# Patient Record
Sex: FEMALE | Race: ASIAN | NOT HISPANIC OR LATINO
[De-identification: names, ages, dates, MRNs, and addresses within clinical notes are randomized per-mention and may not be internally consistent; named-entity substitution may affect disease eponyms.]

---

## 2021-04-30 ENCOUNTER — APPOINTMENT (OUTPATIENT)
Dept: ANTEPARTUM | Facility: CLINIC | Age: 31
End: 2021-04-30
Payer: COMMERCIAL

## 2021-04-30 ENCOUNTER — ASOB RESULT (OUTPATIENT)
Age: 31
End: 2021-04-30

## 2021-04-30 PROCEDURE — 36416 COLLJ CAPILLARY BLOOD SPEC: CPT

## 2021-04-30 PROCEDURE — 99072 ADDL SUPL MATRL&STAF TM PHE: CPT

## 2021-04-30 PROCEDURE — 76801 OB US < 14 WKS SINGLE FETUS: CPT

## 2021-04-30 PROCEDURE — 76813 OB US NUCHAL MEAS 1 GEST: CPT

## 2021-05-19 ENCOUNTER — ASOB RESULT (OUTPATIENT)
Age: 31
End: 2021-05-19

## 2021-05-19 ENCOUNTER — APPOINTMENT (OUTPATIENT)
Dept: MATERNAL FETAL MEDICINE | Facility: CLINIC | Age: 31
End: 2021-05-19

## 2021-05-27 ENCOUNTER — NON-APPOINTMENT (OUTPATIENT)
Age: 31
End: 2021-05-27

## 2021-05-27 ENCOUNTER — ASOB RESULT (OUTPATIENT)
Age: 31
End: 2021-05-27

## 2021-05-27 ENCOUNTER — APPOINTMENT (OUTPATIENT)
Dept: ANTEPARTUM | Facility: CLINIC | Age: 31
End: 2021-05-27
Payer: COMMERCIAL

## 2021-05-27 ENCOUNTER — OUTPATIENT (OUTPATIENT)
Dept: OUTPATIENT SERVICES | Facility: HOSPITAL | Age: 31
LOS: 1 days | End: 2021-05-27

## 2021-05-27 ENCOUNTER — APPOINTMENT (OUTPATIENT)
Dept: ANTEPARTUM | Facility: HOSPITAL | Age: 31
End: 2021-05-27
Payer: COMMERCIAL

## 2021-05-27 DIAGNOSIS — O28.3 ABNORMAL ULTRASONIC FINDING ON ANTENATAL SCREENING OF MOTHER: ICD-10-CM

## 2021-05-27 PROCEDURE — 76946 ECHO GUIDE FOR AMNIOCENTESIS: CPT | Mod: 26

## 2021-05-27 PROCEDURE — 76815 OB US LIMITED FETUS(S): CPT

## 2021-05-27 PROCEDURE — 99072 ADDL SUPL MATRL&STAF TM PHE: CPT

## 2021-05-27 PROCEDURE — 59000 AMNIOCENTESIS DIAGNOSTIC: CPT

## 2021-05-27 PROCEDURE — 99213 OFFICE O/P EST LOW 20 MIN: CPT | Mod: 25

## 2021-06-17 ENCOUNTER — APPOINTMENT (OUTPATIENT)
Dept: ANTEPARTUM | Facility: CLINIC | Age: 31
End: 2021-06-17
Payer: COMMERCIAL

## 2021-06-17 ENCOUNTER — ASOB RESULT (OUTPATIENT)
Age: 31
End: 2021-06-17

## 2021-06-17 PROCEDURE — 99214 OFFICE O/P EST MOD 30 MIN: CPT | Mod: 25

## 2021-06-17 PROCEDURE — 76811 OB US DETAILED SNGL FETUS: CPT

## 2021-06-17 PROCEDURE — 99072 ADDL SUPL MATRL&STAF TM PHE: CPT

## 2021-06-18 ENCOUNTER — NON-APPOINTMENT (OUTPATIENT)
Age: 31
End: 2021-06-18

## 2021-06-18 ENCOUNTER — APPOINTMENT (OUTPATIENT)
Dept: PEDIATRIC CARDIOLOGY | Facility: CLINIC | Age: 31
End: 2021-06-18
Payer: COMMERCIAL

## 2021-06-18 ENCOUNTER — OUTPATIENT (OUTPATIENT)
Dept: OUTPATIENT SERVICES | Age: 31
LOS: 1 days | Discharge: HOME | End: 2021-06-18

## 2021-06-18 PROCEDURE — 76820 UMBILICAL ARTERY ECHO: CPT

## 2021-06-18 PROCEDURE — 76821 MIDDLE CEREBRAL ARTERY ECHO: CPT

## 2021-06-18 PROCEDURE — 93325 DOPPLER ECHO COLOR FLOW MAPG: CPT | Mod: 59

## 2021-06-18 PROCEDURE — 76825 ECHO EXAM OF FETAL HEART: CPT

## 2021-06-18 PROCEDURE — 99204 OFFICE O/P NEW MOD 45 MIN: CPT

## 2021-06-18 PROCEDURE — 99072 ADDL SUPL MATRL&STAF TM PHE: CPT

## 2021-06-18 PROCEDURE — 76827 ECHO EXAM OF FETAL HEART: CPT

## 2021-06-21 ENCOUNTER — NON-APPOINTMENT (OUTPATIENT)
Age: 31
End: 2021-06-21

## 2021-06-22 ENCOUNTER — NON-APPOINTMENT (OUTPATIENT)
Age: 31
End: 2021-06-22

## 2021-07-02 ENCOUNTER — ASOB RESULT (OUTPATIENT)
Age: 31
End: 2021-07-02

## 2021-07-02 ENCOUNTER — APPOINTMENT (OUTPATIENT)
Dept: ANTEPARTUM | Facility: CLINIC | Age: 31
End: 2021-07-02
Payer: COMMERCIAL

## 2021-07-02 PROCEDURE — 99212 OFFICE O/P EST SF 10 MIN: CPT | Mod: 25

## 2021-07-02 PROCEDURE — 99072 ADDL SUPL MATRL&STAF TM PHE: CPT

## 2021-07-02 PROCEDURE — 76815 OB US LIMITED FETUS(S): CPT

## 2021-07-12 DIAGNOSIS — O35.8XX0 MATERNAL CARE FOR OTHER (SUSPECTED) FETAL ABNORMALITY AND DAMAGE, NOT APPLICABLE OR UNSPECIFIED: ICD-10-CM

## 2021-07-12 DIAGNOSIS — O28.3 ABNORMAL ULTRASONIC FINDING ON ANTENATAL SCREENING OF MOTHER: ICD-10-CM

## 2021-07-12 DIAGNOSIS — O31.22X9: ICD-10-CM

## 2021-07-15 ENCOUNTER — ASOB RESULT (OUTPATIENT)
Age: 31
End: 2021-07-15

## 2021-07-15 ENCOUNTER — APPOINTMENT (OUTPATIENT)
Dept: ANTEPARTUM | Facility: CLINIC | Age: 31
End: 2021-07-15
Payer: COMMERCIAL

## 2021-07-15 PROCEDURE — 99072 ADDL SUPL MATRL&STAF TM PHE: CPT

## 2021-07-15 PROCEDURE — 76815 OB US LIMITED FETUS(S): CPT

## 2021-07-16 ENCOUNTER — APPOINTMENT (OUTPATIENT)
Dept: CARDIOTHORACIC SURGERY | Facility: CLINIC | Age: 31
End: 2021-07-16
Payer: COMMERCIAL

## 2021-07-16 ENCOUNTER — APPOINTMENT (OUTPATIENT)
Dept: PEDIATRIC CARDIOLOGY | Facility: CLINIC | Age: 31
End: 2021-07-16
Payer: COMMERCIAL

## 2021-07-16 DIAGNOSIS — O35.8XX0 MATERNAL CARE FOR OTHER (SUSPECTED) FETAL ABNORMALITY AND DAMAGE, NOT APPLICABLE OR UNSPECIFIED: ICD-10-CM

## 2021-07-16 PROCEDURE — 76826 ECHO EXAM OF FETAL HEART: CPT

## 2021-07-16 PROCEDURE — 99072 ADDL SUPL MATRL&STAF TM PHE: CPT

## 2021-07-16 PROCEDURE — 76820 UMBILICAL ARTERY ECHO: CPT

## 2021-07-16 PROCEDURE — 99215 OFFICE O/P EST HI 40 MIN: CPT

## 2021-07-16 PROCEDURE — 99204 OFFICE O/P NEW MOD 45 MIN: CPT

## 2021-07-16 PROCEDURE — 76828 ECHO EXAM OF FETAL HEART: CPT

## 2021-07-16 PROCEDURE — 93325 DOPPLER ECHO COLOR FLOW MAPG: CPT | Mod: 59

## 2021-07-19 NOTE — ASSESSMENT
[FreeTextEntry1] : This baby will have important congenital heart disease with the need for surgery.  I would expect the baby to be relatively well balanced at birth based on the current images, but if the obstruction in the right ventricular outflow progresses and cyanosis is an issue, then intervention will be needed prior to  discharge.  We typically favor an initial palliative approach rather than early repair but evaluate each case individual.  Palliation is either in the cath lab with ductal stent or pulmonary valvuloplasty, or with a BT shunt or outflow patch surgically.   If the saturations are borderline acceptable we may send home with home monitoring.\par \par We typically recommend elective primary repair by 3-4 months of age.  Repair involves VSD closure directing LV flow to the aorta, and relief of any RVOT obstruction.  We aim to preserve native pulmonary valve function if possible to try to lower the risk of reintervention, but regardless the initial outcomes of these operations are generally excellent.  The baby also will have a vascular ring which can be dealt with at the time of the open heart surgery.  Genetic information so far suggests no genetic syndrome which is probably the most important predictor of eventual outcome.\par \par All of this was reviewed with the family and their questions answered.  We look forward to caring for this very nice family.

## 2021-07-19 NOTE — CONSULT LETTER
[Consult Letter:] : I had the pleasure of evaluating your patient, [unfilled]. [Please see my note below.] : Please see my note below. [Consult Closing:] : Thank you very much for allowing me to participate in the care of this patient.  If you have any questions, please do not hesitate to contact me. [Sincerely,] : Sincerely, [Dear  ___] : Dear  [unfilled], [FreeTextEntry2] : July 16, 2021\par \par Frances Hale MD\par [address], [place] [address]\[place] [address] [FreeTextEntry3] : Jhonny Hood MD\par \par Cardiothoracic Surgery and Pediatrics\par [facility]\par \par CC: Stuart Law MD\par Maternal Fetal Medicine

## 2021-07-19 NOTE — HISTORY OF PRESENT ILLNESS
[FreeTextEntry1] : This patient is seen at the request of Dr. Hale at the [facility] in fetal cardiothoracic surgical consultation.  The baby is due in early November.  This will be her first baby.  Genetic evaluation has been normal, male.  There is no family history.  Echo was repeated today and reviewed with Dr. Hale.

## 2021-07-19 NOTE — DATA REVIEWED
[FreeTextEntry1] : fetal echo: TOF (or DORV TOF type) with large malalignment type VSD, subaortic, mild hypoplasia of pulmonary valve, confluent good sized branch PA, right arch with aberrant left subclavian

## 2021-07-28 ENCOUNTER — NON-APPOINTMENT (OUTPATIENT)
Age: 31
End: 2021-07-28

## 2021-07-29 ENCOUNTER — ASOB RESULT (OUTPATIENT)
Age: 31
End: 2021-07-29

## 2021-07-29 ENCOUNTER — APPOINTMENT (OUTPATIENT)
Dept: ANTEPARTUM | Facility: CLINIC | Age: 31
End: 2021-07-29
Payer: COMMERCIAL

## 2021-07-29 PROCEDURE — 76816 OB US FOLLOW-UP PER FETUS: CPT

## 2021-07-29 PROCEDURE — 76819 FETAL BIOPHYS PROFIL W/O NST: CPT

## 2021-08-10 ENCOUNTER — APPOINTMENT (OUTPATIENT)
Dept: PEDIATRIC CARDIOLOGY | Facility: CLINIC | Age: 31
End: 2021-08-10
Payer: COMMERCIAL

## 2021-08-10 PROCEDURE — 76820 UMBILICAL ARTERY ECHO: CPT

## 2021-08-10 PROCEDURE — 76826 ECHO EXAM OF FETAL HEART: CPT

## 2021-08-10 PROCEDURE — 76828 ECHO EXAM OF FETAL HEART: CPT

## 2021-08-10 PROCEDURE — 99214 OFFICE O/P EST MOD 30 MIN: CPT

## 2021-08-10 PROCEDURE — 93325 DOPPLER ECHO COLOR FLOW MAPG: CPT | Mod: 59

## 2021-08-24 ENCOUNTER — APPOINTMENT (OUTPATIENT)
Dept: ANTEPARTUM | Facility: CLINIC | Age: 31
End: 2021-08-24
Payer: COMMERCIAL

## 2021-08-24 ENCOUNTER — ASOB RESULT (OUTPATIENT)
Age: 31
End: 2021-08-24

## 2021-08-24 PROCEDURE — 76819 FETAL BIOPHYS PROFIL W/O NST: CPT

## 2021-08-24 PROCEDURE — 76816 OB US FOLLOW-UP PER FETUS: CPT

## 2021-09-17 ENCOUNTER — APPOINTMENT (OUTPATIENT)
Dept: PEDIATRIC CARDIOLOGY | Facility: CLINIC | Age: 31
End: 2021-09-17
Payer: COMMERCIAL

## 2021-09-17 PROCEDURE — 76826 ECHO EXAM OF FETAL HEART: CPT

## 2021-09-17 PROCEDURE — 99213 OFFICE O/P EST LOW 20 MIN: CPT

## 2021-09-17 PROCEDURE — 76828 ECHO EXAM OF FETAL HEART: CPT

## 2021-09-17 PROCEDURE — 76820 UMBILICAL ARTERY ECHO: CPT

## 2021-09-17 PROCEDURE — 93325 DOPPLER ECHO COLOR FLOW MAPG: CPT | Mod: 59

## 2021-09-21 ENCOUNTER — ASOB RESULT (OUTPATIENT)
Age: 31
End: 2021-09-21

## 2021-09-21 ENCOUNTER — APPOINTMENT (OUTPATIENT)
Dept: ANTEPARTUM | Facility: CLINIC | Age: 31
End: 2021-09-21
Payer: COMMERCIAL

## 2021-09-21 PROCEDURE — 76819 FETAL BIOPHYS PROFIL W/O NST: CPT

## 2021-09-21 PROCEDURE — 76816 OB US FOLLOW-UP PER FETUS: CPT

## 2021-10-04 ENCOUNTER — APPOINTMENT (OUTPATIENT)
Dept: OTHER | Facility: CLINIC | Age: 31
End: 2021-10-04
Payer: COMMERCIAL

## 2021-10-04 PROCEDURE — 99205 OFFICE O/P NEW HI 60 MIN: CPT | Mod: 95

## 2021-10-19 ENCOUNTER — APPOINTMENT (OUTPATIENT)
Dept: ANTEPARTUM | Facility: CLINIC | Age: 31
End: 2021-10-19
Payer: COMMERCIAL

## 2021-10-19 ENCOUNTER — ASOB RESULT (OUTPATIENT)
Age: 31
End: 2021-10-19

## 2021-10-19 PROCEDURE — 76816 OB US FOLLOW-UP PER FETUS: CPT

## 2021-10-19 PROCEDURE — 76819 FETAL BIOPHYS PROFIL W/O NST: CPT

## 2021-11-07 ENCOUNTER — OUTPATIENT (OUTPATIENT)
Dept: OUTPATIENT SERVICES | Facility: HOSPITAL | Age: 31
LOS: 1 days | End: 2021-11-07

## 2021-11-07 DIAGNOSIS — Z20.822 CONTACT WITH AND (SUSPECTED) EXPOSURE TO COVID-19: ICD-10-CM

## 2021-11-07 LAB — SARS-COV-2 RNA SPEC QL NAA+PROBE: SIGNIFICANT CHANGE UP

## 2021-11-09 ENCOUNTER — APPOINTMENT (OUTPATIENT)
Dept: ANTEPARTUM | Facility: CLINIC | Age: 31
End: 2021-11-09

## 2021-11-09 ENCOUNTER — TRANSCRIPTION ENCOUNTER (OUTPATIENT)
Age: 31
End: 2021-11-09

## 2021-11-09 ENCOUNTER — INPATIENT (INPATIENT)
Facility: HOSPITAL | Age: 31
LOS: 1 days | Discharge: HOME | End: 2021-11-11
Attending: SPECIALIST | Admitting: SPECIALIST

## 2021-11-09 VITALS — HEART RATE: 75 BPM | SYSTOLIC BLOOD PRESSURE: 112 MMHG | DIASTOLIC BLOOD PRESSURE: 56 MMHG

## 2021-11-09 DIAGNOSIS — N93.9 ABNORMAL UTERINE AND VAGINAL BLEEDING, UNSPECIFIED: ICD-10-CM

## 2021-11-09 DIAGNOSIS — Z3A.00 WEEKS OF GESTATION OF PREGNANCY NOT SPECIFIED: ICD-10-CM

## 2021-11-09 DIAGNOSIS — O26.899 OTHER SPECIFIED PREGNANCY RELATED CONDITIONS, UNSPECIFIED TRIMESTER: ICD-10-CM

## 2021-11-09 LAB
BASOPHILS # BLD AUTO: 0.03 K/UL — SIGNIFICANT CHANGE UP (ref 0–0.2)
BASOPHILS NFR BLD AUTO: 0.3 % — SIGNIFICANT CHANGE UP (ref 0–2)
BLD GP AB SCN SERPL QL: NEGATIVE — SIGNIFICANT CHANGE UP
COVID-19 SPIKE DOMAIN AB INTERP: POSITIVE
COVID-19 SPIKE DOMAIN ANTIBODY RESULT: >250 U/ML — HIGH
EOSINOPHIL # BLD AUTO: 0.11 K/UL — SIGNIFICANT CHANGE UP (ref 0–0.5)
EOSINOPHIL NFR BLD AUTO: 1.1 % — SIGNIFICANT CHANGE UP (ref 0–6)
HCT VFR BLD CALC: 39.9 % — SIGNIFICANT CHANGE UP (ref 34.5–45)
HGB BLD-MCNC: 12.9 G/DL — SIGNIFICANT CHANGE UP (ref 11.5–15.5)
IANC: 7.72 K/UL — SIGNIFICANT CHANGE UP (ref 1.5–8.5)
IMM GRANULOCYTES NFR BLD AUTO: 1 % — SIGNIFICANT CHANGE UP (ref 0–1.5)
LYMPHOCYTES # BLD AUTO: 1.43 K/UL — SIGNIFICANT CHANGE UP (ref 1–3.3)
LYMPHOCYTES # BLD AUTO: 14.4 % — SIGNIFICANT CHANGE UP (ref 13–44)
MCHC RBC-ENTMCNC: 29.6 PG — SIGNIFICANT CHANGE UP (ref 27–34)
MCHC RBC-ENTMCNC: 32.3 GM/DL — SIGNIFICANT CHANGE UP (ref 32–36)
MCV RBC AUTO: 91.5 FL — SIGNIFICANT CHANGE UP (ref 80–100)
MONOCYTES # BLD AUTO: 0.51 K/UL — SIGNIFICANT CHANGE UP (ref 0–0.9)
MONOCYTES NFR BLD AUTO: 5.2 % — SIGNIFICANT CHANGE UP (ref 2–14)
NEUTROPHILS # BLD AUTO: 7.72 K/UL — HIGH (ref 1.8–7.4)
NEUTROPHILS NFR BLD AUTO: 78 % — HIGH (ref 43–77)
NRBC # BLD: 0 /100 WBCS — SIGNIFICANT CHANGE UP
NRBC # FLD: 0 K/UL — SIGNIFICANT CHANGE UP
PLATELET # BLD AUTO: 210 K/UL — SIGNIFICANT CHANGE UP (ref 150–400)
RBC # BLD: 4.36 M/UL — SIGNIFICANT CHANGE UP (ref 3.8–5.2)
RBC # FLD: 18.2 % — HIGH (ref 10.3–14.5)
RH IG SCN BLD-IMP: POSITIVE — SIGNIFICANT CHANGE UP
RH IG SCN BLD-IMP: POSITIVE — SIGNIFICANT CHANGE UP
SARS-COV-2 IGG+IGM SERPL QL IA: >250 U/ML — HIGH
SARS-COV-2 IGG+IGM SERPL QL IA: POSITIVE
T PALLIDUM AB TITR SER: NEGATIVE — SIGNIFICANT CHANGE UP
WBC # BLD: 9.9 K/UL — SIGNIFICANT CHANGE UP (ref 3.8–10.5)
WBC # FLD AUTO: 9.9 K/UL — SIGNIFICANT CHANGE UP (ref 3.8–10.5)

## 2021-11-09 RX ORDER — SODIUM CHLORIDE 9 MG/ML
3 INJECTION INTRAMUSCULAR; INTRAVENOUS; SUBCUTANEOUS EVERY 8 HOURS
Refills: 0 | Status: DISCONTINUED | OUTPATIENT
Start: 2021-11-09 | End: 2021-11-11

## 2021-11-09 RX ORDER — HYDROCORTISONE 1 %
1 OINTMENT (GRAM) TOPICAL EVERY 6 HOURS
Refills: 0 | Status: DISCONTINUED | OUTPATIENT
Start: 2021-11-09 | End: 2021-11-11

## 2021-11-09 RX ORDER — ACETAMINOPHEN 500 MG
975 TABLET ORAL
Refills: 0 | Status: DISCONTINUED | OUTPATIENT
Start: 2021-11-09 | End: 2021-11-11

## 2021-11-09 RX ORDER — OXYTOCIN 10 UNIT/ML
333.33 VIAL (ML) INJECTION
Qty: 20 | Refills: 0 | Status: DISCONTINUED | OUTPATIENT
Start: 2021-11-09 | End: 2021-11-11

## 2021-11-09 RX ORDER — BENZOCAINE 10 %
1 GEL (GRAM) MUCOUS MEMBRANE EVERY 6 HOURS
Refills: 0 | Status: DISCONTINUED | OUTPATIENT
Start: 2021-11-09 | End: 2021-11-11

## 2021-11-09 RX ORDER — DIBUCAINE 1 %
1 OINTMENT (GRAM) RECTAL EVERY 6 HOURS
Refills: 0 | Status: DISCONTINUED | OUTPATIENT
Start: 2021-11-09 | End: 2021-11-11

## 2021-11-09 RX ORDER — SIMETHICONE 80 MG/1
80 TABLET, CHEWABLE ORAL EVERY 4 HOURS
Refills: 0 | Status: DISCONTINUED | OUTPATIENT
Start: 2021-11-09 | End: 2021-11-11

## 2021-11-09 RX ORDER — KETOROLAC TROMETHAMINE 30 MG/ML
30 SYRINGE (ML) INJECTION ONCE
Refills: 0 | Status: DISCONTINUED | OUTPATIENT
Start: 2021-11-09 | End: 2021-11-10

## 2021-11-09 RX ORDER — OXYCODONE HYDROCHLORIDE 5 MG/1
5 TABLET ORAL
Refills: 0 | Status: DISCONTINUED | OUTPATIENT
Start: 2021-11-09 | End: 2021-11-11

## 2021-11-09 RX ORDER — AER TRAVELER 0.5 G/1
1 SOLUTION RECTAL; TOPICAL EVERY 4 HOURS
Refills: 0 | Status: DISCONTINUED | OUTPATIENT
Start: 2021-11-09 | End: 2021-11-11

## 2021-11-09 RX ORDER — PRAMOXINE HYDROCHLORIDE 150 MG/15G
1 AEROSOL, FOAM RECTAL EVERY 4 HOURS
Refills: 0 | Status: DISCONTINUED | OUTPATIENT
Start: 2021-11-09 | End: 2021-11-11

## 2021-11-09 RX ORDER — MAGNESIUM HYDROXIDE 400 MG/1
30 TABLET, CHEWABLE ORAL
Refills: 0 | Status: DISCONTINUED | OUTPATIENT
Start: 2021-11-09 | End: 2021-11-11

## 2021-11-09 RX ORDER — OXYTOCIN 10 UNIT/ML
2 VIAL (ML) INJECTION
Qty: 30 | Refills: 0 | Status: DISCONTINUED | OUTPATIENT
Start: 2021-11-09 | End: 2021-11-09

## 2021-11-09 RX ORDER — LANOLIN
1 OINTMENT (GRAM) TOPICAL EVERY 6 HOURS
Refills: 0 | Status: DISCONTINUED | OUTPATIENT
Start: 2021-11-09 | End: 2021-11-11

## 2021-11-09 RX ORDER — OXYTOCIN 10 UNIT/ML
VIAL (ML) INJECTION
Qty: 20 | Refills: 0 | Status: DISCONTINUED | OUTPATIENT
Start: 2021-11-09 | End: 2021-11-09

## 2021-11-09 RX ORDER — IBUPROFEN 200 MG
600 TABLET ORAL EVERY 6 HOURS
Refills: 0 | Status: COMPLETED | OUTPATIENT
Start: 2021-11-09 | End: 2022-10-08

## 2021-11-09 RX ORDER — DIPHENHYDRAMINE HCL 50 MG
25 CAPSULE ORAL EVERY 6 HOURS
Refills: 0 | Status: DISCONTINUED | OUTPATIENT
Start: 2021-11-09 | End: 2021-11-11

## 2021-11-09 RX ORDER — SODIUM CHLORIDE 9 MG/ML
1000 INJECTION, SOLUTION INTRAVENOUS
Refills: 0 | Status: DISCONTINUED | OUTPATIENT
Start: 2021-11-09 | End: 2021-11-09

## 2021-11-09 RX ORDER — TETANUS TOXOID, REDUCED DIPHTHERIA TOXOID AND ACELLULAR PERTUSSIS VACCINE, ADSORBED 5; 2.5; 8; 8; 2.5 [IU]/.5ML; [IU]/.5ML; UG/.5ML; UG/.5ML; UG/.5ML
0.5 SUSPENSION INTRAMUSCULAR ONCE
Refills: 0 | Status: DISCONTINUED | OUTPATIENT
Start: 2021-11-09 | End: 2021-11-11

## 2021-11-09 RX ORDER — OXYCODONE HYDROCHLORIDE 5 MG/1
5 TABLET ORAL ONCE
Refills: 0 | Status: DISCONTINUED | OUTPATIENT
Start: 2021-11-09 | End: 2021-11-11

## 2021-11-09 RX ADMIN — Medication 2 MILLIUNIT(S)/MIN: at 09:39

## 2021-11-09 RX ADMIN — SODIUM CHLORIDE 125 MILLILITER(S): 9 INJECTION, SOLUTION INTRAVENOUS at 09:38

## 2021-11-09 NOTE — OB PROVIDER DELIVERY SUMMARY - NSPROVIDERDELIVERYNOTE_OBGYN_ALL_OB_FT
Spontaneous vaginal delivery of liveborn infant from [facility]. Head, shoulders and body delivered easily. Infant was suctioned and handed off to mother/peds. Placenta delivered intact with a 3 vessel cord. Fundal massage was given and uterine fundus was found to be firm. Vaginal exam revealed an intact cervix, vaginal walls and sulci. 3rd degree laceration was noted, and repaired with enforcement of the transfer perineal muscle using 2.0 chromic suture in the usual fashion. Due ot lower uterine segment atony, cytotec ME was administered. Fundus and lower uterine segment was further massaged and found to be firm there after. Excellent hemostasis noted. Patient was stable. Count was correct x2. APGAR 8/9 BW: Pending.    Amyeo Afroz Jereen, PGY-1 Spontaneous vaginal delivery of liveborn infant from [facility]. Head, shoulders and body delivered easily. Infant was suctioned and handed off to mother/peds. Placenta delivered intact with a 3 vessel cord. Fundal massage was given and uterine fundus was found to be firm. Vaginal exam revealed an intact cervix, vaginal walls and sulci. 3rd degree laceration was noted, and repaired with enforcement of the transfer perineal muscle using 2.0 chromic suture in the usual fashion. Due ot lower uterine segment atony, cytotec OR was administered. Fundus and lower uterine segment was further massaged and found to be firm there after. Excellent hemostasis noted. Patient was stable. Count was correct x2. APGAR 8/9 BW: 7lb7oz    Amyeo Afroz Jereen, PGY-1

## 2021-11-09 NOTE — OB RN TRIAGE NOTE - NS_VISITREASON1_OBGYN_ALL_OB
Labor at Term/Vaginal Bleeding
Louis Crenshaw)  Orthopaedic Surgery; Surgery of the Hand  [address] [place] [address]  Phone: [phone]  Fax: [phone]  Follow Up Time:

## 2021-11-09 NOTE — OB PROVIDER TRIAGE NOTE - NSOBPROVIDERNOTE_OBGYN_ALL_OB_FT
30yo  female P0 @ 40.0 wks SLIUP scheduled to be IOL tomorrow for fetal cardiac issues here with cat II NST   -Dr Tellez present in triage 1 for prolonged NST  -pt was admitted for Cat II NST  -pt dw Dr Tellez and pt Is for pitocin IOL and cervical tobar balloon  -Dr Cesar aware and will call peds  -pt was swabbed for Covid-19 11/7 and was negative; support person is vaccinated

## 2021-11-09 NOTE — OB PROVIDER LABOR PROGRESS NOTE - NS_OBIHIFHRDETAILS_OBGYN_ALL_OB_FT
135/mod mehul/+ accels/occasional variables
135/mod mehul/+ accels/no decels
145/mod mehul/+acels/early decels Cat II overall reassuring

## 2021-11-09 NOTE — OB PROVIDER H&P - ASSESSMENT
32yo  female P0 @ 40.0 wks SLIUP scheduled to be IOL tomorrow for fetal cardiac issues here with cat II NST   -Dr Tellez present in triage 1 for prolonged NST  -pt was admitted for Cat II NST  -pt dw Dr Tellez and pt Is for pitocin IOL and cervical tobar balloon  -Dr Cesar aware and will call peds  -pt was swabbed for Covid-19 11/7 and was negative; support person is vaccinated 30yo  female P0 @ 40.0 wks SLIUP scheduled to be IOL tomorrow for fetal cardiac issues here with cat II NST   -Dr Tellez present in triage 1 for prolonged NST  -pt was admitted for Cat II NST  -pt dw Dr Tellez and pt Is for pitocin IOL and cervical tobar balloon  -Dr Cesar aware and will call peds  -pt was swabbed for Covid-19 11/7 and was negative; support person is vaccinated    Ob attending    as above    fht cat 1 then discontinuous but audible decrease in FHT for <5 min with spontaneous recovery--  patient on her back as I entered room with IV being placed==recovered with change in position to lateral position  decision made to admit and induce as above  NICU to be made aware  discussed with patient and     Jennifer MAE

## 2021-11-09 NOTE — OB PROVIDER H&P - NSHPPHYSICALEXAM_GEN_ALL_CORE
Gen: A&O x 3; ND  Vitals; BP-112/56; P-75; T-36.7    Pulm-CTA B/L; no wheezes  Cor-clear S1S2; no murmurs  ABd exam-soft and nontender    NST cat II with mod variability; no accels and 4 min prolonged deceleration; Irreg ctx's  VE-1/80/-2    GBS neg  EFW~3657

## 2021-11-09 NOTE — DISCHARGE NOTE OB - PATIENT PORTAL LINK FT
You can access the FollowMyHealth Patient Portal offered by [facility] by registering at the following website: [email or web address]. By joining [employer]’s FollowMyHealth portal, you will also be able to view your health information using other applications (apps) compatible with our system.

## 2021-11-09 NOTE — DISCHARGE NOTE OB - CARE PROVIDER_API CALL
Stuart Law)  Obstetrics and Gynecology  [address] [place] [address]  Phone: [phone]  Fax: [phone]  Follow Up Time:

## 2021-11-09 NOTE — OB RN PATIENT PROFILE - NS_OTHER_OBGYN_ALL_OB_FT
twin pregnancy-one baby passed at 6-8 wks twin pregnancy-one baby passed at 6-8 wks (vanishing twin)

## 2021-11-09 NOTE — OB PROVIDER DELIVERY SUMMARY - NSSELHIDDEN_OBGYN_ALL_OB_FT
[NS_DeliveryAttending1_OBGYN_ALL_OB_FT:[id number]] [NS_DeliveryAttending1_OBGYN_ALL_OB_FT:[id number]=],[NS_DeliveryAssist1_OBGYN_ALL_OB_FT:[id number]=]

## 2021-11-09 NOTE — OB PROVIDER TRIAGE NOTE - HISTORY OF PRESENT ILLNESS
32yo  female P0 @ 40.0 wks SLIUP here complaining of vaginal bleeding that started pinkish over night and progressively got heavier with time. Pt reports she was examined yesterday and was 1 cm.   PNC complicated by a vanishing twin and this fetus has a TOF and needs peds at delivery.    Fetal alert: FETAL cardiac alert TOF ( OR DORV TOF type) with large VSD , SUBAORTIC , MILD HYPOLASIA OF PULMONARY VALVE , CONFLUENT GOOD SIZED BRANCH PA , right arch with aberrant subclavian.  normal amnio  vanishing twin in this pregnancy   s/p CT consult     Pmhx-denies  Pshx/Hosp-denies  Meds-PNV  NKDA  Past Ob-denies  Gyn-hx abnormal PAP's  Soc-denies

## 2021-11-09 NOTE — DISCHARGE NOTE OB - CARE PLAN
1 Principal Discharge DX:	 (spontaneous vaginal delivery)  Assessment and plan of treatment:	F/U 2 WEEKS

## 2021-11-09 NOTE — OB PROVIDER LABOR PROGRESS NOTE - NS_SUBJECTIVE/OBJECTIVE_OBGYN_ALL_OB_FT
Pt seen for cervical evaluation
Patient tolerated exam and was AROM'd at 5:45pm with light meconium.
Pt seen for cervical evaluation sp epidural

## 2021-11-09 NOTE — OB RN TRIAGE NOTE - NSMATERNALFETALCONCERNS_OBGYN_ALL_OB_FT
FETAL ALERT  FETAL cardiac alert TOF ( OR DORV TOF type) with large VSD , SUBAORTIC , MILD HYPOLASIA OF PULMONARY VALVE , CONFLUENT GOOD SIZED BRANCH PA , right arch with aberrant subclavian.  normal amnio  vanishing twin in this pregnancy   s/p CT consult   ALERT NICU

## 2021-11-09 NOTE — OB PROVIDER LABOR PROGRESS NOTE - ASSESSMENT
"Chief Complaint   Patient presents with     Fall     fell off her horse, x 4/1/2017       Initial /80 (BP Location: Left arm, Patient Position: Chair, Cuff Size: Adult Large)  Pulse 76  Temp 98.9  F (37.2  C) (Oral)  Resp 16  Wt 209 lb 1.6 oz (94.8 kg)  BMI 34.01 kg/m2 Estimated body mass index is 34.01 kg/(m^2) as calculated from the following:    Height as of 1/30/17: 5' 5.75\" (1.67 m).    Weight as of this encounter: 209 lb 1.6 oz (94.8 kg).  Medication Reconciliation: complete     Shaina DE LA GARZA LPN      "
cervical balloon not placed   Continue pitocin  MYRON chau, NP
Patient tolerated exam well. Light meconium noted with AROM. Continue to monitor for change in status.    Kenyatta Worthington, PGY1 
30 y/o  @40w Cat 2 IOL  unchanged exam    Continue pitocin   Consider AROM next exam   Left message for Dr Torsten chau, NP

## 2021-11-10 RX ORDER — IBUPROFEN 200 MG
600 TABLET ORAL EVERY 6 HOURS
Refills: 0 | Status: DISCONTINUED | OUTPATIENT
Start: 2021-11-10 | End: 2021-11-11

## 2021-11-10 RX ADMIN — Medication 975 MILLIGRAM(S): at 09:30

## 2021-11-10 RX ADMIN — MAGNESIUM HYDROXIDE 30 MILLILITER(S): 400 TABLET, CHEWABLE ORAL at 08:29

## 2021-11-10 RX ADMIN — Medication 975 MILLIGRAM(S): at 14:59

## 2021-11-10 RX ADMIN — Medication 600 MILLIGRAM(S): at 06:58

## 2021-11-10 RX ADMIN — Medication 975 MILLIGRAM(S): at 22:35

## 2021-11-10 RX ADMIN — SODIUM CHLORIDE 3 MILLILITER(S): 9 INJECTION INTRAMUSCULAR; INTRAVENOUS; SUBCUTANEOUS at 07:02

## 2021-11-10 RX ADMIN — Medication 600 MILLIGRAM(S): at 06:31

## 2021-11-10 RX ADMIN — Medication 30 MILLIGRAM(S): at 01:40

## 2021-11-10 RX ADMIN — Medication 600 MILLIGRAM(S): at 18:08

## 2021-11-10 RX ADMIN — Medication 1000 MILLIUNIT(S)/MIN: at 02:51

## 2021-11-10 RX ADMIN — Medication 975 MILLIGRAM(S): at 22:04

## 2021-11-10 RX ADMIN — Medication 600 MILLIGRAM(S): at 13:22

## 2021-11-10 RX ADMIN — Medication 975 MILLIGRAM(S): at 08:30

## 2021-11-10 RX ADMIN — Medication 30 MILLIGRAM(S): at 00:41

## 2021-11-10 RX ADMIN — Medication 975 MILLIGRAM(S): at 16:00

## 2021-11-10 RX ADMIN — Medication 600 MILLIGRAM(S): at 01:40

## 2021-11-10 NOTE — OB POSTPARTUM EVENT NOTE - NS_EVENTPTSUMMARY1_OBGYN_ALL_OB_FT
BP: 99/52; HR 71; O2: 92%; RR17; QBL at delivery: 600ml; QBL two hours into recovery additional 75ml.

## 2021-11-10 NOTE — OB RN DELIVERY SUMMARY - NSSELHIDDEN_OBGYN_ALL_OB_FT
[NS_DeliveryAttending1_OBGYN_ALL_OB_FT:[id number]],[NS_DeliveryAttending2_OBGYN_ALL_OB_FT:[id number]],[NS_DeliveryAssist1_OBGYN_ALL_OB_FT:[id number]=],[NS_DeliveryAssist2_OBGYN_ALL_OB_FT:[id number]=],[NS_DeliveryRN_OBGYN_ALL_OB_FT:[id number]=]

## 2021-11-10 NOTE — OB POSTPARTUM EVENT NOTE - NS_EVENTSUMMARY2_OBGYN_ALL_OB_FT
Notified of patient successfully passing orthostatic hypotension assessment and able to ambulate to bathroom. Notified that patient is 3hrs postpartum and unable to void at this time.

## 2021-11-10 NOTE — OB RN DELIVERY SUMMARY - NS_SEPSISRSKCALC_OBGYN_ALL_OB_FT
EOS calculated successfully. EOS Risk Factor: 0.5/1000 live births ([facility] national incidence); GA=40w;Temp=98.96; ROM=5.35; GBS='Negative'; Antibiotics='No antibiotics or any antibiotics < 2 hrs prior to birth'

## 2021-11-10 NOTE — OB POSTPARTUM EVENT NOTE - NS_EVENTSUMMARY1_OBGYN_ALL_OB_FT
Dr. Rajput notified of patient reporting dizziness when assisted to sitting position for orthostatic hypotension assessment. No changes in vital signs. Reported on fundal assessment: firm, scent bleeding, no clots. NVSD at 2314.

## 2021-11-10 NOTE — OB NEONATOLOGY/PEDIATRICIAN DELIVERY SUMMARY - NSPEDSNEONOTESA_OBGYN_ALL_OB_FT
40 weeks GA infant 40 weeks GA infant delivered vaginally to a 31 y/o  a+ mother. Pregnancy significant for vanished twin at 6-8 weeks and fetal alert for DORV/ VSD vs TET, subaortic and mild pulmonary valve hypoplasia, right arch and left aberrant subclavian. Prenatal labs " GBS neg (10/12), HIV neg, RPR non-reactive, HBsAg neg, rubella immune. COVID neg. AROM on  at 1753 to light mec. Maternal Tmax 37.2. Infant delivered vertex with spontaneous cry and good tone. Dried, suctioned and positioned. Apgars 8/9. Mother plans to breastfeed and would like her infant to receive Hep B vaccine. EOS 0.16. Maternal highest temperature 37.2. Mother plans to breastfeed and would like her infant to receive Hep B vaccine

## 2021-11-11 VITALS
TEMPERATURE: 98 F | SYSTOLIC BLOOD PRESSURE: 102 MMHG | HEART RATE: 77 BPM | DIASTOLIC BLOOD PRESSURE: 53 MMHG | RESPIRATION RATE: 16 BRPM | OXYGEN SATURATION: 98 %

## 2021-11-11 RX ORDER — IBUPROFEN 200 MG
1 TABLET ORAL
Qty: 0 | Refills: 0 | DISCHARGE
Start: 2021-11-11

## 2021-11-11 RX ORDER — ACETAMINOPHEN 500 MG
3 TABLET ORAL
Qty: 0 | Refills: 0 | DISCHARGE
Start: 2021-11-11

## 2021-11-11 RX ADMIN — Medication 600 MILLIGRAM(S): at 03:00

## 2021-11-11 RX ADMIN — Medication 1 TABLET(S): at 11:48

## 2021-11-11 RX ADMIN — Medication 600 MILLIGRAM(S): at 10:02

## 2021-11-11 RX ADMIN — Medication 975 MILLIGRAM(S): at 06:59

## 2021-11-11 RX ADMIN — Medication 975 MILLIGRAM(S): at 14:44

## 2021-11-11 RX ADMIN — Medication 600 MILLIGRAM(S): at 02:26

## 2021-11-11 RX ADMIN — Medication 600 MILLIGRAM(S): at 17:11

## 2021-11-11 NOTE — PROGRESS NOTE ADULT - SUBJECTIVE AND OBJECTIVE BOX
S: Patient doing well. Minimal lochia. Pain controlled.    O: Vital Signs Last 24 Hrs  T(C): 37 (2021 07:), Max: 37 (2021 07:)  T(F): 98.6 (2021 07:), Max: 98.6 (2021 07:)  HR: 73 (:) (73 - 84)  BP: 102/61 (:) (98/59 - 102/61)  BP(mean): --  RR: 18 (:) (15 - 18)  SpO2: 98% (:) (98% - 98%)    Gen: NAD  Abd: soft, NT, ND, fundus firm below umbilicus  Lochia: moderate  Ext: no tenderness    Labs:      A: 31y PPD#2 s/p  doing well.  Plan: Routine care Dc with instructions
S: Patient doing well. Minimal lochia. Pain controlled.  Post Partum day : 1  O: Vital Signs Last 24 Hrs  T(C): 37.2 (10 Nov 2021 04:00), Max: 37.2 (2021 19:19)  T(F): 98.9 (10 Nov 2021 04:00), Max: 99 (2021 19:19)  HR: 76 (10 Nov 2021 04:00) (65 - 122)  BP: 99/58 (10 Nov 2021 04:00) (87/59 - 126/80)  BP(mean): --  RR: 16 (10 Nov 2021 04:00) (12 - 18)  SpO2: 98% (10 Nov 2021 04:00) (77% - 100%)    Gen: No acute distress  Abd: soft, NT,  fundus firm below umbilicus  Lochia: Normal  Ext: no tenderness    Labs:                        12.9   9.90  )-----------( 210      ( 2021 07:25 )             39.9       A: 31y PPD # 1 s/p  doing well.    Plan: Routine care

## 2022-05-16 ENCOUNTER — RESULT REVIEW (OUTPATIENT)
Age: 32
End: 2022-05-16

## 2022-09-20 NOTE — OB RN TRIAGE NOTE - SUICIDE SCREENING DEPRESSION
Pt needs mri for renal massess    Please confirm prior to follow up    1) mri of kidney  2) labs      Negative

## 2023-10-23 PROBLEM — Z78.9 OTHER SPECIFIED HEALTH STATUS: Chronic | Status: ACTIVE | Noted: 2021-11-09

## 2024-01-30 ENCOUNTER — APPOINTMENT (OUTPATIENT)
Dept: INTERNAL MEDICINE | Facility: CLINIC | Age: 34
End: 2024-01-30
Payer: COMMERCIAL

## 2024-01-30 ENCOUNTER — NON-APPOINTMENT (OUTPATIENT)
Age: 34
End: 2024-01-30

## 2024-01-30 VITALS
HEART RATE: 70 BPM | DIASTOLIC BLOOD PRESSURE: 78 MMHG | HEIGHT: 64 IN | SYSTOLIC BLOOD PRESSURE: 110 MMHG | WEIGHT: 147 LBS | TEMPERATURE: 98.5 F | OXYGEN SATURATION: 99 % | RESPIRATION RATE: 14 BRPM | BODY MASS INDEX: 25.1 KG/M2

## 2024-01-30 DIAGNOSIS — Z00.00 ENCOUNTER FOR GENERAL ADULT MEDICAL EXAMINATION W/OUT ABNORMAL FINDINGS: ICD-10-CM

## 2024-01-30 DIAGNOSIS — Z78.9 OTHER SPECIFIED HEALTH STATUS: ICD-10-CM

## 2024-01-30 DIAGNOSIS — Z23 ENCOUNTER FOR IMMUNIZATION: ICD-10-CM

## 2024-01-30 PROCEDURE — 90707 MMR VACCINE SC: CPT

## 2024-01-30 PROCEDURE — 90471 IMMUNIZATION ADMIN: CPT

## 2024-01-30 PROCEDURE — 99385 PREV VISIT NEW AGE 18-39: CPT | Mod: 25

## 2024-01-30 NOTE — HEALTH RISK ASSESSMENT
[Good] : ~his/her~  mood as  good [No falls in past year] : Patient reported no falls in the past year [0] : 2) Feeling down, depressed, or hopeless: Not at all (0) [PHQ-2 Negative - No further assessment needed] : PHQ-2 Negative - No further assessment needed [[id number]] : 0

## 2024-01-31 LAB
25(OH)D3 SERPL-MCNC: 26.4 NG/ML
ALBUMIN SERPL ELPH-MCNC: 4.6 G/DL
ALP BLD-CCNC: 54 U/L
ALT SERPL-CCNC: 12 U/L
ANION GAP SERPL CALC-SCNC: 11 MMOL/L
APPEARANCE: CLEAR
AST SERPL-CCNC: 14 U/L
BACTERIA: NEGATIVE /HPF
BASOPHILS # BLD AUTO: 0.04 K/UL
BASOPHILS NFR BLD AUTO: 0.6 %
BILIRUB SERPL-MCNC: 0.7 MG/DL
BILIRUBIN URINE: NEGATIVE
BLOOD URINE: NEGATIVE
BUN SERPL-MCNC: 10 MG/DL
CALCIUM SERPL-MCNC: 9.2 MG/DL
CAST: 0 /LPF
CHLORIDE SERPL-SCNC: 102 MMOL/L
CHOLEST SERPL-MCNC: 214 MG/DL
CO2 SERPL-SCNC: 26 MMOL/L
COLOR: YELLOW
CREAT SERPL-MCNC: 0.62 MG/DL
EGFR: 120 ML/MIN/1.73M2
EOSINOPHIL # BLD AUTO: 0.14 K/UL
EOSINOPHIL NFR BLD AUTO: 2 %
EPITHELIAL CELLS: 0 /HPF
ESTIMATED AVERAGE GLUCOSE: 105 MG/DL
FOLATE SERPL-MCNC: 15.9 NG/ML
GLUCOSE QUALITATIVE U: NEGATIVE MG/DL
GLUCOSE SERPL-MCNC: 79 MG/DL
HBA1C MFR BLD HPLC: 5.3 %
HCT VFR BLD CALC: 38.5 %
HDLC SERPL-MCNC: 80 MG/DL
HGB BLD-MCNC: 12.4 G/DL
IMM GRANULOCYTES NFR BLD AUTO: 0.3 %
KETONES URINE: ABNORMAL MG/DL
LDLC SERPL CALC-MCNC: 125 MG/DL
LEUKOCYTE ESTERASE URINE: NEGATIVE
LYMPHOCYTES # BLD AUTO: 2.51 K/UL
LYMPHOCYTES NFR BLD AUTO: 36.6 %
MAN DIFF?: NORMAL
MCHC RBC-ENTMCNC: 29.2 PG
MCHC RBC-ENTMCNC: 32.2 GM/DL
MCV RBC AUTO: 90.8 FL
MICROSCOPIC-UA: NORMAL
MONOCYTES # BLD AUTO: 0.23 K/UL
MONOCYTES NFR BLD AUTO: 3.4 %
NEUTROPHILS # BLD AUTO: 3.92 K/UL
NEUTROPHILS NFR BLD AUTO: 57.1 %
NITRITE URINE: NEGATIVE
NONHDLC SERPL-MCNC: 134 MG/DL
PH URINE: 7
PLATELET # BLD AUTO: 339 K/UL
POTASSIUM SERPL-SCNC: 4 MMOL/L
PROT SERPL-MCNC: 7.2 G/DL
PROTEIN URINE: NEGATIVE MG/DL
RBC # BLD: 4.24 M/UL
RBC # FLD: 12.8 %
RED BLOOD CELLS URINE: 1 /HPF
SODIUM SERPL-SCNC: 138 MMOL/L
SPECIFIC GRAVITY URINE: 1.01
TRIGL SERPL-MCNC: 54 MG/DL
TSH SERPL-ACNC: 0.92 UIU/ML
UROBILINOGEN URINE: 0.2 MG/DL
VIT B12 SERPL-MCNC: 853 PG/ML
WBC # FLD AUTO: 6.86 K/UL
WHITE BLOOD CELLS URINE: 1 /HPF

## 2024-07-30 NOTE — OB PROVIDER H&P - NS_OTHER_OBGYN_ALL_OB_FT
Pharmacy faxed in request for medication refill. Medication(s) set up as pending orders from medication list.    Preferred pharmacy has been set up and verified        Preferred contact number#  Mobile:    Mobile [phone]          twin pregnancy-one baby passed at 6-8 wks

## 2024-08-09 ENCOUNTER — APPOINTMENT (OUTPATIENT)
Dept: INTERNAL MEDICINE | Facility: CLINIC | Age: 34
End: 2024-08-09

## 2024-08-09 PROBLEM — F41.9 ANXIETY: Status: ACTIVE | Noted: 2024-08-09

## 2024-08-09 PROCEDURE — 99212 OFFICE O/P EST SF 10 MIN: CPT

## 2024-08-09 NOTE — ASSESSMENT
[FreeTextEntry1] : Anxiety-due to situation.  Referral to mental health counselor for evaluation.  Will assess the situation and follow-up with me after the visit.  Time spent on phone and charting 12 minutes
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
## 2024-08-12 ENCOUNTER — TRANSCRIPTION ENCOUNTER (OUTPATIENT)
Age: 34
End: 2024-08-12